# Patient Record
Sex: MALE | URBAN - METROPOLITAN AREA
[De-identification: names, ages, dates, MRNs, and addresses within clinical notes are randomized per-mention and may not be internally consistent; named-entity substitution may affect disease eponyms.]

---

## 2021-05-25 ENCOUNTER — RECORDS - HEALTHEAST (OUTPATIENT)
Dept: ADMINISTRATIVE | Facility: CLINIC | Age: 56
End: 2021-05-25

## 2025-07-12 ENCOUNTER — APPOINTMENT (OUTPATIENT)
Dept: RADIOLOGY | Facility: CLINIC | Age: 60
End: 2025-07-12
Payer: COMMERCIAL

## 2025-07-12 ENCOUNTER — HOSPITAL ENCOUNTER (EMERGENCY)
Facility: CLINIC | Age: 60
Discharge: HOME | End: 2025-07-12
Attending: EMERGENCY MEDICINE | Admitting: EMERGENCY MEDICINE
Payer: COMMERCIAL

## 2025-07-12 VITALS
WEIGHT: 177 LBS | HEART RATE: 67 BPM | SYSTOLIC BLOOD PRESSURE: 135 MMHG | RESPIRATION RATE: 17 BRPM | TEMPERATURE: 97.5 F | DIASTOLIC BLOOD PRESSURE: 79 MMHG | OXYGEN SATURATION: 97 %

## 2025-07-12 DIAGNOSIS — S51.812A LACERATION OF LEFT FOREARM, INITIAL ENCOUNTER: ICD-10-CM

## 2025-07-12 DIAGNOSIS — S49.92XA INJURY OF LEFT UPPER ARM, INITIAL ENCOUNTER: ICD-10-CM

## 2025-07-12 LAB
ANION GAP SERPL CALCULATED.3IONS-SCNC: 14 MMOL/L (ref 7–15)
BASOPHILS # BLD AUTO: 0 10E3/UL (ref 0–0.2)
BASOPHILS NFR BLD AUTO: 0 %
BUN SERPL-MCNC: 14.6 MG/DL (ref 8–23)
CALCIUM SERPL-MCNC: 9.8 MG/DL (ref 8.8–10.4)
CHLORIDE SERPL-SCNC: 103 MMOL/L (ref 98–107)
CK SERPL-CCNC: 122 U/L (ref 39–308)
CREAT SERPL-MCNC: 1.18 MG/DL (ref 0.67–1.17)
EGFRCR SERPLBLD CKD-EPI 2021: 71 ML/MIN/1.73M2
EOSINOPHIL # BLD AUTO: 0.1 10E3/UL (ref 0–0.7)
EOSINOPHIL NFR BLD AUTO: 2 %
ERYTHROCYTE [DISTWIDTH] IN BLOOD BY AUTOMATED COUNT: 12.5 % (ref 10–15)
GLUCOSE SERPL-MCNC: 109 MG/DL (ref 70–99)
HCO3 SERPL-SCNC: 21 MMOL/L (ref 22–29)
HCT VFR BLD AUTO: 42.5 % (ref 40–53)
HGB BLD-MCNC: 14.8 G/DL (ref 13.3–17.7)
IMM GRANULOCYTES # BLD: 0 10E3/UL
IMM GRANULOCYTES NFR BLD: 0 %
LYMPHOCYTES # BLD AUTO: 3.3 10E3/UL (ref 0.8–5.3)
LYMPHOCYTES NFR BLD AUTO: 43 %
MCH RBC QN AUTO: 28.6 PG (ref 26.5–33)
MCHC RBC AUTO-ENTMCNC: 34.8 G/DL (ref 31.5–36.5)
MCV RBC AUTO: 82 FL (ref 78–100)
MONOCYTES # BLD AUTO: 0.6 10E3/UL (ref 0–1.3)
MONOCYTES NFR BLD AUTO: 7 %
NEUTROPHILS # BLD AUTO: 3.7 10E3/UL (ref 1.6–8.3)
NEUTROPHILS NFR BLD AUTO: 48 %
NRBC # BLD AUTO: 0 10E3/UL
NRBC BLD AUTO-RTO: 0 /100
PLATELET # BLD AUTO: 330 10E3/UL (ref 150–450)
POTASSIUM SERPL-SCNC: 3.9 MMOL/L (ref 3.4–5.3)
RBC # BLD AUTO: 5.17 10E6/UL (ref 4.4–5.9)
SODIUM SERPL-SCNC: 138 MMOL/L (ref 135–145)
WBC # BLD AUTO: 7.7 10E3/UL (ref 4–11)

## 2025-07-12 PROCEDURE — 82550 ASSAY OF CK (CPK): CPT

## 2025-07-12 PROCEDURE — 73030 X-RAY EXAM OF SHOULDER: CPT | Mod: LT

## 2025-07-12 PROCEDURE — 250N000009 HC RX 250

## 2025-07-12 PROCEDURE — 250N000011 HC RX IP 250 OP 636

## 2025-07-12 PROCEDURE — 12001 RPR S/N/AX/GEN/TRNK 2.5CM/<: CPT | Mod: LT

## 2025-07-12 PROCEDURE — 73060 X-RAY EXAM OF HUMERUS: CPT | Mod: LT

## 2025-07-12 PROCEDURE — 73110 X-RAY EXAM OF WRIST: CPT | Mod: LT

## 2025-07-12 PROCEDURE — 73090 X-RAY EXAM OF FOREARM: CPT | Mod: LT

## 2025-07-12 PROCEDURE — 80048 BASIC METABOLIC PNL TOTAL CA: CPT

## 2025-07-12 PROCEDURE — 85025 COMPLETE CBC W/AUTO DIFF WBC: CPT

## 2025-07-12 PROCEDURE — 73130 X-RAY EXAM OF HAND: CPT | Mod: LT

## 2025-07-12 PROCEDURE — 36415 COLL VENOUS BLD VENIPUNCTURE: CPT

## 2025-07-12 PROCEDURE — 73080 X-RAY EXAM OF ELBOW: CPT | Mod: LT

## 2025-07-12 PROCEDURE — 99284 EMERGENCY DEPT VISIT MOD MDM: CPT | Mod: 25

## 2025-07-12 RX ORDER — FENTANYL CITRATE 50 UG/ML
100 INJECTION, SOLUTION INTRAMUSCULAR; INTRAVENOUS ONCE
Status: DISCONTINUED | OUTPATIENT
Start: 2025-07-12 | End: 2025-07-12

## 2025-07-12 RX ORDER — METHYLCELLULOSE 4000CPS 30 %
POWDER (GRAM) MISCELLANEOUS ONCE
Status: COMPLETED | OUTPATIENT
Start: 2025-07-12 | End: 2025-07-12

## 2025-07-12 RX ORDER — OXYCODONE HYDROCHLORIDE 5 MG/1
5 TABLET ORAL EVERY 6 HOURS PRN
Qty: 12 TABLET | Refills: 0 | Status: SHIPPED | OUTPATIENT
Start: 2025-07-12 | End: 2025-07-15

## 2025-07-12 RX ORDER — HYDROMORPHONE HYDROCHLORIDE 1 MG/ML
0.5 INJECTION, SOLUTION INTRAMUSCULAR; INTRAVENOUS; SUBCUTANEOUS
Status: COMPLETED | OUTPATIENT
Start: 2025-07-12 | End: 2025-07-12

## 2025-07-12 RX ADMIN — HYDROMORPHONE HYDROCHLORIDE 0.5 MG: 1 INJECTION, SOLUTION INTRAMUSCULAR; INTRAVENOUS; SUBCUTANEOUS at 14:27

## 2025-07-12 RX ADMIN — HYDROMORPHONE HYDROCHLORIDE 1 MG: 1 INJECTION, SOLUTION INTRAMUSCULAR; INTRAVENOUS; SUBCUTANEOUS at 13:58

## 2025-07-12 RX ADMIN — Medication: at 16:13

## 2025-07-12 ASSESSMENT — ACTIVITIES OF DAILY LIVING (ADL)
ADLS_ACUITY_SCORE: 41

## 2025-07-12 NOTE — ED PROVIDER NOTES
EMERGENCY DEPARTMENT ENCOUNTER      NAME: Murali Worthy  AGE: 60 year old male  YOB: 1965  MRN: [id number]  EVALUATION DATE & TIME: 7/12/2025  1:48 PM    PCP: No primary care provider on file.    ED PROVIDER: Alia Gallegos PA-C    CHIEF COMPLAINT  Arm Injury      FINAL IMPRESSION:      ICD-10-CM    1. Injury of left upper arm, initial encounter  S49.92XA       2. Laceration of left forearm, initial encounter  S51.812A           MEDICAL DECISION MAKING AND ED COURSE:  Pertinent Labs & Imaging studies reviewed (See chart for details)  60-year-old male history of cirrhosis are presenting in the ER today after an injury to his left arm.  Reports that he got caught between the tractor and the tree.  Vitals reviewed, hypertensive at 141/81 and borderline tachycardic at 99.  Patient is in distress, writhing in pain.  Holding his left arm in the flexed position near his body.  Limited examination due to pain.  Notable swelling to the dorsal aspect of the left distal forearm with an overlying skin abrasion.  Radial pulse 2+.  Able to move fingers but endorses pain.  Sensation intact to the fingers.  Unable to abduct the shoulder and overall limited range of motion of the arm secondary to pain.  There is no obvious open fracture.  There is no significant ecchymosis or deformities noted.    Given the level of discomfort of the patient as well as mechanism of injury, x-ray imaging of the entire arm was obtained including shoulder down through finger.  Patient was given Dilaudid for pain and labs were obtained including CK to assess for traumatic rhabdomyolysis.    .  No leukocytosis or anemia.  Electrolytes within normal limits.  X-ray imaging of the shoulder, humerus, elbow, radius/ulna, wrist, hand without any fracture/dislocations. Swelling to the dorsal aspect of the distal forearm. Patient feeling improved after pain medications here. Would was cleaned with soapy water, LET applied, and dermabond  applied. Nothing on exam to suggest compartment syndrome. No fractures or dislocations. No laboratory values to suggest traumatic rhabdomyolysis. Will recommend elevation, ice, sling, tylenol, ibuprofen, and a few tablets of oxycodone. Follow up with PCP and strict return precautions. Discharged in stable condition. All questions answered.       MEDICATIONS GIVEN IN THE EMERGENCY:  Medications   HYDROmorphone (DILAUDID) injection 1 mg (1 mg Intravenous $Given 7/12/25 1358)   HYDROmorphone (PF) (DILAUDID) injection 0.5 mg (0.5 mg Intravenous $Given 7/12/25 1427)   lido-EPINEPHrine-tetracaine (LET) topical gel GEL ( Topical $Given 7/12/25 1613)   methylcellulose powder ( Topical Not Given 7/12/25 1613)       NEW PRESCRIPTIONS STARTED AT TODAY'S ER VISIT  Discharge Medication List as of 7/12/2025  4:33 PM        START taking these medications    Details   oxyCODONE (ROXICODONE) 5 MG tablet Take 1 tablet (5 mg) by mouth every 6 hours as needed for moderate to severe pain., Disp-12 tablet, R-0, Local Print           Discharge Medication List as of 7/12/2025  4:33 PM          =================================================================    HPI    Patient information was obtained from: patient  Use of : N/A     Murali Worthy is a 60 year old male with no pertinent medical history who presents to this ED by walk-in for evaluation of left arm pain.    History limited due to patient's level of discomfort.     Patient presents with left arm pain caused form traumatic injury sustained earlier today (7/12/25). Patient was reaching back with his left arm when it got caught between a tree limb and a tractor. The pain is diffuse along left shoulder to left hand. No other concerns at this time.     Per [facility], patient last Tetanus was 2022.     PHYSICAL EXAM    /79   Pulse 67   Temp 97.5  F (36.4  C) (Oral)   Resp 17   Wt 80.3 kg (177 lb)   SpO2 97%   Constitutional:  Alert, in no acute distress  EYES:  Conjunctivae clear  HENT:  Atraumatic, normocephalic. No lara sign or racoon eyes. Nose normal. Mucous membranes moist.   Respiratory:  Respirations even, unlabored, in no acute respiratory distress  Cardiovascular:  Regular rate and rhythm, good peripheral perfusion  Musculoskeletal: Moderate swelling over the left forearm with superficial skin sheering wound about 2 cm, full sensation in the left radial, ulnar and median nerve distributions, no snuffbox tenderness, no gross bony deformities. Limited ROM of the left arm due to pain.   Integument: Warm & dry. No appreciable rash, erythema.  Neurologic:  Alert & oriented, speech clear and fluent, no focal deficits noted  Psych: Normal mood and affect        LAB:  All pertinent labs reviewed and interpreted.  Results for orders placed or performed during the hospital encounter of 07/12/25   Elbow  XR, G/E 3 views, left    Impression    IMPRESSION: Anatomic alignment left elbow. No acute displaced left elbow fracture. No significant elbow joint space narrowing. No appreciable elbow joint effusion. Mild dorsal elbow soft tissue swelling.      XR Wrist Left G/E 3 Views    Impression    IMPRESSION: Anatomic alignment left wrist. No acute displaced left wrist fracture is identified. Mild radial-sided distal forearm/wrist soft tissue swelling. Chronic ossicle at the tip of the ulnar styloid relates to remote trauma. Mild degenerative   change at the thumb carpometacarpal joint.      XR Hand Left G/E 3 Views    Impression    IMPRESSION: Anatomic alignment left hand. No acute displaced left hand fracture is identified. Chronic ossicle is seen along the distal tip of the ulnar styloid, related to remote trauma. Mild scattered degenerative change in the wrist and hand. No   localizing soft tissue swelling.      Humerus XR,  G/E 2 views, left    Impression    IMPRESSION: Anatomic alignment left humerus. No acute displaced left humerus fracture is identified. Mild degenerative  change at the acromioclavicular joint with chronic bone fragment or bone fragments along the superior left AC joint. No appreciable   left arm soft tissue swelling. Visualized left lung is grossly clear.   Radius/Ulna XR,  PA &LAT, left    Impression    IMPRESSION: Anatomic alignment left forearm. No acute displaced forearm fracture is identified. Chronic ossicle at the tip of the ulnar styloid relates to remote trauma. Mild degenerative change at the thumb CMC joint. No appreciable elbow joint   effusion. Distal forearm and dorsal elbow soft tissue swelling.     XR Shoulder Left 2 Views    Impression    IMPRESSION: Anatomic alignment left shoulder. No acute displaced left shoulder fracture is identified. Mild degenerative arthrosis at the left acromioclavicular joint with heterotopic ossicles along the superior distal clavicle and AC joint region. The   glenohumeral joint space is not well profiled. Flat acromion. Visualized left lung is grossly clear.      Basic metabolic panel   Result Value Ref Range    Sodium 138 135 - 145 mmol/L    Potassium 3.9 3.4 - 5.3 mmol/L    Chloride 103 98 - 107 mmol/L    Carbon Dioxide (CO2) 21 (L) 22 - 29 mmol/L    Anion Gap 14 7 - 15 mmol/L    Urea Nitrogen 14.6 8.0 - 23.0 mg/dL    Creatinine 1.18 (H) 0.67 - 1.17 mg/dL    GFR Estimate 71 >60 mL/min/1.73m2    Calcium 9.8 8.8 - 10.4 mg/dL    Glucose 109 (H) 70 - 99 mg/dL   Result Value Ref Range     39 - 308 U/L   CBC with platelets and differential   Result Value Ref Range    WBC Count 7.7 4.0 - 11.0 10e3/uL    RBC Count 5.17 4.40 - 5.90 10e6/uL    Hemoglobin 14.8 13.3 - 17.7 g/dL    Hematocrit 42.5 40.0 - 53.0 %    MCV 82 78 - 100 fL    MCH 28.6 26.5 - 33.0 pg    MCHC 34.8 31.5 - 36.5 g/dL    RDW 12.5 10.0 - 15.0 %    Platelet Count 330 150 - 450 10e3/uL    % Neutrophils 48 %    % Lymphocytes 43 %    % Monocytes 7 %    % Eosinophils 2 %    % Basophils 0 %    % Immature Granulocytes 0 %    NRBCs per 100 WBC 0 <1 /100     Absolute Neutrophils 3.7 1.6 - 8.3 10e3/uL    Absolute Lymphocytes 3.3 0.8 - 5.3 10e3/uL    Absolute Monocytes 0.6 0.0 - 1.3 10e3/uL    Absolute Eosinophils 0.1 0.0 - 0.7 10e3/uL    Absolute Basophils 0.0 0.0 - 0.2 10e3/uL    Absolute Immature Granulocytes 0.0 <=0.4 10e3/uL    Absolute NRBCs 0.0 10e3/uL       RADIOLOGY:  Reviewed all pertinent imaging. Please see official radiology report.  Humerus XR,  G/E 2 views, left   Final Result   IMPRESSION: Anatomic alignment left humerus. No acute displaced left humerus fracture is identified. Mild degenerative change at the acromioclavicular joint with chronic bone fragment or bone fragments along the superior left AC joint. No appreciable    left arm soft tissue swelling. Visualized left lung is grossly clear.      XR Shoulder Left 2 Views   Final Result   IMPRESSION: Anatomic alignment left shoulder. No acute displaced left shoulder fracture is identified. Mild degenerative arthrosis at the left acromioclavicular joint with heterotopic ossicles along the superior distal clavicle and AC joint region. The    glenohumeral joint space is not well profiled. Flat acromion. Visualized left lung is grossly clear.          XR Hand Left G/E 3 Views   Final Result   IMPRESSION: Anatomic alignment left hand. No acute displaced left hand fracture is identified. Chronic ossicle is seen along the distal tip of the ulnar styloid, related to remote trauma. Mild scattered degenerative change in the wrist and hand. No    localizing soft tissue swelling.          XR Wrist Left G/E 3 Views   Final Result   IMPRESSION: Anatomic alignment left wrist. No acute displaced left wrist fracture is identified. Mild radial-sided distal forearm/wrist soft tissue swelling. Chronic ossicle at the tip of the ulnar styloid relates to remote trauma. Mild degenerative    change at the thumb carpometacarpal joint.          Elbow  XR, G/E 3 views, left   Final Result   IMPRESSION: Anatomic alignment left  elbow. No acute displaced left elbow fracture. No significant elbow joint space narrowing. No appreciable elbow joint effusion. Mild dorsal elbow soft tissue swelling.          Radius/Ulna XR,  PA &LAT, left   Final Result   IMPRESSION: Anatomic alignment left forearm. No acute displaced forearm fracture is identified. Chronic ossicle at the tip of the ulnar styloid relates to remote trauma. Mild degenerative change at the thumb CMC joint. No appreciable elbow joint    effusion. Distal forearm and dorsal elbow soft tissue swelling.               PROCEDURES:   PROCEDURE: Laceration Repair   INDICATIONS: Laceration   PROCEDURE PROVIDER: Alia Gallegos PA-C   SITE: Distal dorsal left forearm   TYPE/SIZE: simple, superficial, and no foreign body visualized  2 cm (total length)   FUNCTIONAL ASSESSMENT: Distal sensation, circulation, and motor intact   MEDICATION: LET   PREPARATION: scrubbing and irrigation with Sterile water and Warm soapy water   DEBRIDEMENT: no debridement   CLOSURE:  Superficial layer closed with Dermabond (medical glue)    Total number of sutures/staples placed: none         Medical Decision Making  I obtained history from Family Member/Significant Other  Discharge. I prescribed additional prescription strength medication(s) as charted. See documentation for any additional details.    MIPS (CTPE, Dental pain, Arevalo, Sinusitis, Asthma/COPD, Head Trauma):     SEPSIS: None      I, Merrick Eckert, am serving as a scribe to document services personally performed by Alia Gallegos PA-C based on my observation and the provider's statements to me. I, Alia Gallegos, attest that Merrick Eckert is acting in a scribe capacity, has observed my performance of the services and has documented them in accordance with my direction.    Alia Gallegos PA-C  [facility] EMERGENCY ROOM  [address] [address]  [phone]      Alia Gallegos PA-C  07/12/25 1934

## 2025-07-12 NOTE — ED PROVIDER NOTES
Emergency Department Midlevel Supervisory Note     I had a face to face encounter with this patient seen by the Advanced Practice Provider (RICHARD). I personally made/approved the management plan and take responsibility for the patient management. I personally saw patient and performed a substantive portion of the visit including all aspects of the medical decision making.     ED Course:  3:00 Alia Gallegos PA-C staffed patient with me. I agree with their assessment and plan of management, and I will see the patient.  3:01 PM  I met with the patient to introduce myself, gather additional history, perform my initial exam, and discuss the plan.     Brief HPI:     Murali Worthy is a 60 year old male who presents for evaluation of an arm injury.    History limited due to patient's level of discomfort.     The patient presents with left arm pain caused form traumatic injury sustained earlier today (7/12/25). The patient endorses that he was reaching back with his left arm when it got caught between a tree limb and a tractor. The pain is diffuse along left shoulder to left hand. The patient has no other concerns at this time.     I, Monika Bronson, am serving as a scribe to document services personally performed by Victoriano Murray MD, based on my observations and the provider's statements to me.   I, Victoriano Murray MD attest that Monika Bronson was acting in a scribe capacity, has observed my performance of the services and has documented them in accordance with my direction.    Brief Physical Exam: /79   Pulse 67   Temp 97.5  F (36.4  C) (Oral)   Resp 17   Wt 80.3 kg (177 lb)   SpO2 97%   Constitutional:  Alert, in no acute distress  EYES: Conjunctivae clear  HENT:  Atraumatic  Respiratory:  Respirations even, unlabored, in no acute respiratory distress  Cardiovascular:  Regular rate and rhythm, good peripheral perfusion  GI: Soft, non-distended, non-tender  Musculoskeletal: Moderate swelling over the  left forearm with superficial laceration, full sensation in the left radial, ulnar and median nerve distributions, no snuffbox tenderness, no gross bony deformities.  Integument: Warm & dry. No appreciable rash, erythema.  Neurologic:  Alert & oriented, speech clear and fluent, no focal deficits noted  Psych: Normal mood and affect       MDM:  Again, patient is 60-year-old male who presents with left arm pain.  Patient appears nontoxic with stable vital signs.  He is afebrile with no tachycardia or hypoxia.  Per review of the medical record, did review office visit through [facility] on 4/4/2022, patient has history of ACL injury tear, osteoarthritis of the right knee.  Patient had compression type injury today to his left forearm.  Patient is neurovascular intact with good distal sensation and pulses, there is no prolonged compression style injury.  Clinically nothing to suggest compartment syndrome, septic joint, cellulitis, necrotizing fasciitis, certainly concern for soft tissue injury, fracture or dislocation.  Will obtain screening labs and plain films of the left upper extremity.  Patient was given pain medications.       1. Injury of left upper arm, initial encounter    2. Laceration of left forearm, initial encounter        Consults:      Labs and Imaging:  Results for orders placed or performed during the hospital encounter of 07/12/25   Elbow  XR, G/E 3 views, left    Impression    IMPRESSION: Anatomic alignment left elbow. No acute displaced left elbow fracture. No significant elbow joint space narrowing. No appreciable elbow joint effusion. Mild dorsal elbow soft tissue swelling.      XR Wrist Left G/E 3 Views    Impression    IMPRESSION: Anatomic alignment left wrist. No acute displaced left wrist fracture is identified. Mild radial-sided distal forearm/wrist soft tissue swelling. Chronic ossicle at the tip of the ulnar styloid relates to remote trauma. Mild degenerative    change at the thumb carpometacarpal joint.      XR Hand Left G/E 3 Views    Impression    IMPRESSION: Anatomic alignment left hand. No acute displaced left hand fracture is identified. Chronic ossicle is seen along the distal tip of the ulnar styloid, related to remote trauma. Mild scattered degenerative change in the wrist and hand. No   localizing soft tissue swelling.      Humerus XR,  G/E 2 views, left    Impression    IMPRESSION: Anatomic alignment left humerus. No acute displaced left humerus fracture is identified. Mild degenerative change at the acromioclavicular joint with chronic bone fragment or bone fragments along the superior left AC joint. No appreciable   left arm soft tissue swelling. Visualized left lung is grossly clear.   Radius/Ulna XR,  PA &LAT, left    Impression    IMPRESSION: Anatomic alignment left forearm. No acute displaced forearm fracture is identified. Chronic ossicle at the tip of the ulnar styloid relates to remote trauma. Mild degenerative change at the thumb CMC joint. No appreciable elbow joint   effusion. Distal forearm and dorsal elbow soft tissue swelling.     XR Shoulder Left 2 Views    Impression    IMPRESSION: Anatomic alignment left shoulder. No acute displaced left shoulder fracture is identified. Mild degenerative arthrosis at the left acromioclavicular joint with heterotopic ossicles along the superior distal clavicle and AC joint region. The   glenohumeral joint space is not well profiled. Flat acromion. Visualized left lung is grossly clear.      Basic metabolic panel   Result Value Ref Range    Sodium 138 135 - 145 mmol/L    Potassium 3.9 3.4 - 5.3 mmol/L    Chloride 103 98 - 107 mmol/L    Carbon Dioxide (CO2) 21 (L) 22 - 29 mmol/L    Anion Gap 14 7 - 15 mmol/L    Urea Nitrogen 14.6 8.0 - 23.0 mg/dL    Creatinine 1.18 (H) 0.67 - 1.17 mg/dL    GFR Estimate 71 >60 mL/min/1.73m2    Calcium 9.8 8.8 - 10.4 mg/dL    Glucose 109 (H) 70 - 99 mg/dL   Result Value Ref Range      39 - 308 U/L   CBC with platelets and differential   Result Value Ref Range    WBC Count 7.7 4.0 - 11.0 10e3/uL    RBC Count 5.17 4.40 - 5.90 10e6/uL    Hemoglobin 14.8 13.3 - 17.7 g/dL    Hematocrit 42.5 40.0 - 53.0 %    MCV 82 78 - 100 fL    MCH 28.6 26.5 - 33.0 pg    MCHC 34.8 31.5 - 36.5 g/dL    RDW 12.5 10.0 - 15.0 %    Platelet Count 330 150 - 450 10e3/uL    % Neutrophils 48 %    % Lymphocytes 43 %    % Monocytes 7 %    % Eosinophils 2 %    % Basophils 0 %    % Immature Granulocytes 0 %    NRBCs per 100 WBC 0 <1 /100    Absolute Neutrophils 3.7 1.6 - 8.3 10e3/uL    Absolute Lymphocytes 3.3 0.8 - 5.3 10e3/uL    Absolute Monocytes 0.6 0.0 - 1.3 10e3/uL    Absolute Eosinophils 0.1 0.0 - 0.7 10e3/uL    Absolute Basophils 0.0 0.0 - 0.2 10e3/uL    Absolute Immature Granulocytes 0.0 <=0.4 10e3/uL    Absolute NRBCs 0.0 10e3/uL       I have reviewed the relevant laboratory studies above.    Labs by my independent interpretation showed no signs of hypokalemia with potassium 3.9 and no signs of anemia with a hemoglobin of 14.8.    I independently interpreted the following imaging study(s):       EKG: I reviewed and independently interpreted the patient's EKG, with comments made as listed below. Please see scanned EKG for full report.       Procedures:  I was present for the key portions of procedures documented in RICHARD/midlevel note, see midlevel note for further details.    Victoriano Murray MD  [facility] EMERGENCY ROOM  [address] [address] [phone]       Victoriano Murray MD  07/12/25 5777

## 2025-07-12 NOTE — ED TRIAGE NOTES
Patient presenting to triage from home. Patient reports he was mowing grass on a lawmower when he lifted his arm above his head and got it caught between the top bar and a tree branch.    Laceration noted to forearm, CMS intact. Patient clutching arm, diaphoretic and reporting pain 10/10.

## 2025-07-12 NOTE — ED NOTES
Abrasion/skin tear to left lower forearm was cleaned with shur-clens and normal saline.  Pt reported some pain, was able to tolerate cleaning.  Provider notified.

## 2025-07-12 NOTE — DISCHARGE INSTRUCTIONS
You were seen in the emergency department after you injured your left arm.  The good news is that your scans do not show that you have any fractures or dislocations.  I do think all of this is soft tissue swelling.  You do have a small laceration that seems fairly superficial so we were able to close this with some Dermabond.  Will give you a sling for comfort and ultimately your arm is going to be sore.  You can take Tylenol 1000 mg plus ibuprofen 600 mg every 6 hours for your pain.  I will give you a few tablets of something stronger for breakthrough/severe pain.  Ice the arm, keep it elevated.  If you develop severe sudden onset pain that is associated with numbness and tingling to the arm, inability to move your fingers, or other more concerning symptoms, I would want you to be reevaluated in the emergency department.  As for the Dermabond, it will fall off on its own.  It is water resistant so you are able to shower and be exposed to water with it.  Although, limiting this will prolong the life of the Dermabond but it is not contraindicated to be exposed to water when you have it.  If you develop any signs of infection surrounding the wound including redness, drainage, etc., return for reevaluation.

## 2025-08-31 ENCOUNTER — HEALTH MAINTENANCE LETTER (OUTPATIENT)
Age: 60
End: 2025-08-31